# Patient Record
Sex: FEMALE | Race: WHITE | NOT HISPANIC OR LATINO | URBAN - METROPOLITAN AREA
[De-identification: names, ages, dates, MRNs, and addresses within clinical notes are randomized per-mention and may not be internally consistent; named-entity substitution may affect disease eponyms.]

---

## 2019-03-23 VITALS
DIASTOLIC BLOOD PRESSURE: 77 MMHG | RESPIRATION RATE: 20 BRPM | TEMPERATURE: 97 F | OXYGEN SATURATION: 94 % | SYSTOLIC BLOOD PRESSURE: 155 MMHG | HEART RATE: 69 BPM | WEIGHT: 141.98 LBS

## 2019-03-23 LAB
ANION GAP SERPL CALC-SCNC: 10 MMOL/L — SIGNIFICANT CHANGE UP (ref 5–17)
APTT BLD: 33.7 SEC — SIGNIFICANT CHANGE UP (ref 27.5–36.3)
BASOPHILS # BLD AUTO: 0 K/UL — SIGNIFICANT CHANGE UP (ref 0–0.2)
BASOPHILS NFR BLD AUTO: 0 % — SIGNIFICANT CHANGE UP (ref 0–2)
BUN SERPL-MCNC: 20 MG/DL — SIGNIFICANT CHANGE UP (ref 7–23)
CALCIUM SERPL-MCNC: 9.1 MG/DL — SIGNIFICANT CHANGE UP (ref 8.4–10.5)
CHLORIDE SERPL-SCNC: 101 MMOL/L — SIGNIFICANT CHANGE UP (ref 96–108)
CO2 SERPL-SCNC: 28 MMOL/L — SIGNIFICANT CHANGE UP (ref 22–31)
CREAT SERPL-MCNC: 1.32 MG/DL — HIGH (ref 0.5–1.3)
EOSINOPHIL # BLD AUTO: 0 K/UL — SIGNIFICANT CHANGE UP (ref 0–0.5)
EOSINOPHIL NFR BLD AUTO: 0 % — SIGNIFICANT CHANGE UP (ref 0–6)
GLUCOSE SERPL-MCNC: 129 MG/DL — HIGH (ref 70–99)
HCT VFR BLD CALC: 35.2 % — SIGNIFICANT CHANGE UP (ref 34.5–45)
HGB BLD-MCNC: 11.1 G/DL — LOW (ref 11.5–15.5)
INR BLD: 1.12 — SIGNIFICANT CHANGE UP (ref 0.88–1.16)
LYMPHOCYTES # BLD AUTO: 0.57 K/UL — LOW (ref 1–3.3)
LYMPHOCYTES # BLD AUTO: 8.7 % — LOW (ref 13–44)
MCHC RBC-ENTMCNC: 31.5 GM/DL — LOW (ref 32–36)
MCHC RBC-ENTMCNC: 34.2 PG — HIGH (ref 27–34)
MCV RBC AUTO: 108.3 FL — HIGH (ref 80–100)
MONOCYTES # BLD AUTO: 0.17 K/UL — SIGNIFICANT CHANGE UP (ref 0–0.9)
MONOCYTES NFR BLD AUTO: 2.6 % — SIGNIFICANT CHANGE UP (ref 2–14)
NEUTROPHILS # BLD AUTO: 5.79 K/UL — SIGNIFICANT CHANGE UP (ref 1.8–7.4)
NEUTROPHILS NFR BLD AUTO: 85.2 % — HIGH (ref 43–77)
PLATELET # BLD AUTO: 179 K/UL — SIGNIFICANT CHANGE UP (ref 150–400)
POTASSIUM SERPL-MCNC: 4.3 MMOL/L — SIGNIFICANT CHANGE UP (ref 3.5–5.3)
POTASSIUM SERPL-SCNC: 4.3 MMOL/L — SIGNIFICANT CHANGE UP (ref 3.5–5.3)
PROTHROM AB SERPL-ACNC: 12.7 SEC — SIGNIFICANT CHANGE UP (ref 10–12.9)
RBC # BLD: 3.25 M/UL — LOW (ref 3.8–5.2)
RBC # FLD: 14.9 % — HIGH (ref 10.3–14.5)
SODIUM SERPL-SCNC: 139 MMOL/L — SIGNIFICANT CHANGE UP (ref 135–145)
WBC # BLD: 6.53 K/UL — SIGNIFICANT CHANGE UP (ref 3.8–10.5)
WBC # FLD AUTO: 6.53 K/UL — SIGNIFICANT CHANGE UP (ref 3.8–10.5)

## 2019-03-23 PROCEDURE — 73552 X-RAY EXAM OF FEMUR 2/>: CPT | Mod: 26,RT

## 2019-03-23 PROCEDURE — 71045 X-RAY EXAM CHEST 1 VIEW: CPT | Mod: 26

## 2019-03-23 PROCEDURE — 73502 X-RAY EXAM HIP UNI 2-3 VIEWS: CPT | Mod: 26,RT

## 2019-03-23 PROCEDURE — 73560 X-RAY EXAM OF KNEE 1 OR 2: CPT | Mod: 26,RT

## 2019-03-23 RX ORDER — MORPHINE SULFATE 50 MG/1
4 CAPSULE, EXTENDED RELEASE ORAL ONCE
Qty: 0 | Refills: 0 | Status: DISCONTINUED | OUTPATIENT
Start: 2019-03-23 | End: 2019-03-23

## 2019-03-23 RX ADMIN — MORPHINE SULFATE 4 MILLIGRAM(S): 50 CAPSULE, EXTENDED RELEASE ORAL at 23:24

## 2019-03-23 NOTE — ED PROVIDER NOTE - OBJECTIVE STATEMENT
89 year old female with history of bilateral knee prosthesis presents to ED with concern 89 year old female with history of bilateral knee prosthesis presents to ED with concern mechanical fall today in bathroom immediately prior to arrival.  Patient is complaining of right knee and hip pain.  She denies hit to head, loss of consciousness, upper extremity pain, left lower extremity pain or any additional acute medical complaints or concerns at this time.  She has not attempted ambulation following incident today.  She declines pain medication in ED on initial evaluation.

## 2019-03-23 NOTE — ED PROVIDER NOTE - NEUROLOGICAL, MLM
Alert and oriented, no focal deficits, no motor or sensory deficits.  Sensation intact and symmetric throughout bilateral LEs.  Dorsalis pedis pulses intact and symmetric to bilateral LEs.

## 2019-03-23 NOTE — ED PROVIDER NOTE - CARE PLAN
Principal Discharge DX:	Hip pain  Secondary Diagnosis:	Acute pain of right knee  Secondary Diagnosis:	Fall, initial encounter

## 2019-03-23 NOTE — ED ADULT NURSE NOTE - INTERVENTIONS DEFINITIONS
Reinforce activity limits and safety measures with patient and family/[place] to call system/Non-slip footwear when patient is off stretcher/Provide visual cue, wrist band, yellow gown, etc./Physically safe environment: no spills, clutter or unnecessary equipment/Instruct patient to call for assistance

## 2019-03-23 NOTE — ED ADULT TRIAGE NOTE - CHIEF COMPLAINT QUOTE
pt BIBA from home s/p trip and fall in the bathroom at home denies head trauma LOC complains of pain to right hip and right knee takes eliquis. Pt on chronic 2L home O2

## 2019-03-23 NOTE — ED ADULT NURSE NOTE - OBJECTIVE STATEMENT
Patient presents to the ED with c/o right hip, thigh and knee pain s/p mechanical fall in bathroom. AAOX4, denies head trauma, LOC, dizziness, CP or SOB. States hx of knee surgery. right leg appears shorter than the left at this time. Movement causes a remarkable increase in pain for the patient. NAD noted

## 2019-03-23 NOTE — ED PROVIDER NOTE - MUSCULOSKELETAL, MLM
Spine appears normal, there is no midline cervical, thoracic or lumbar spine pain/tenderness/stepoff/deformity.  range of motion is limited to RLE 2/2 pain, no obvious deformity to affected LLE.  Compartments soft x 4 extremities.

## 2019-03-23 NOTE — ED PROVIDER NOTE - CLINICAL SUMMARY MEDICAL DECISION MAKING FREE TEXT BOX
Patient in ED s/p mechanical fall with complaint of right hip and knee pain.  Preop clearance completed and plain film imaging reviewed.  Distal femur fracture noted.  Ortho in ED and knee immobilizer is placed.  Pain controlled with IV narcotics and CT knee and hip completed.  X ray imaging of hip concerning for possible fracture - CT to confirm.  Ortho requesting tele admission to ortho and will f/u CT imaging.  Patient aware of plan and in agreement.  Will admit at this time.

## 2019-03-24 ENCOUNTER — INPATIENT (INPATIENT)
Facility: HOSPITAL | Age: 84
LOS: 2 days | Discharge: NURSING, REHAB OR LONG-TERM CARE | DRG: 467 | End: 2019-03-27
Attending: ORTHOPAEDIC SURGERY | Admitting: ORTHOPAEDIC SURGERY
Payer: MEDICARE

## 2019-03-24 DIAGNOSIS — D50.0 IRON DEFICIENCY ANEMIA SECONDARY TO BLOOD LOSS (CHRONIC): ICD-10-CM

## 2019-03-24 DIAGNOSIS — I48.2 CHRONIC ATRIAL FIBRILLATION: ICD-10-CM

## 2019-03-24 DIAGNOSIS — Z98.890 OTHER SPECIFIED POSTPROCEDURAL STATES: Chronic | ICD-10-CM

## 2019-03-24 DIAGNOSIS — G47.33 OBSTRUCTIVE SLEEP APNEA (ADULT) (PEDIATRIC): ICD-10-CM

## 2019-03-24 DIAGNOSIS — R06.09 OTHER FORMS OF DYSPNEA: ICD-10-CM

## 2019-03-24 DIAGNOSIS — Z01.818 ENCOUNTER FOR OTHER PREPROCEDURAL EXAMINATION: ICD-10-CM

## 2019-03-24 DIAGNOSIS — N17.9 ACUTE KIDNEY FAILURE, UNSPECIFIED: ICD-10-CM

## 2019-03-24 LAB
ANION GAP SERPL CALC-SCNC: 9 MMOL/L — SIGNIFICANT CHANGE UP (ref 5–17)
APTT BLD: 31.6 SEC — SIGNIFICANT CHANGE UP (ref 27.5–36.3)
BASOPHILS # BLD AUTO: 0.06 K/UL — SIGNIFICANT CHANGE UP (ref 0–0.2)
BASOPHILS NFR BLD AUTO: 0.8 % — SIGNIFICANT CHANGE UP (ref 0–2)
BLD GP AB SCN SERPL QL: NEGATIVE — SIGNIFICANT CHANGE UP
BUN SERPL-MCNC: 19 MG/DL — SIGNIFICANT CHANGE UP (ref 7–23)
CALCIUM SERPL-MCNC: 8.5 MG/DL — SIGNIFICANT CHANGE UP (ref 8.4–10.5)
CHLORIDE SERPL-SCNC: 104 MMOL/L — SIGNIFICANT CHANGE UP (ref 96–108)
CO2 SERPL-SCNC: 28 MMOL/L — SIGNIFICANT CHANGE UP (ref 22–31)
CREAT SERPL-MCNC: 1.23 MG/DL — SIGNIFICANT CHANGE UP (ref 0.5–1.3)
EOSINOPHIL # BLD AUTO: 0.04 K/UL — SIGNIFICANT CHANGE UP (ref 0–0.5)
EOSINOPHIL NFR BLD AUTO: 0.6 % — SIGNIFICANT CHANGE UP (ref 0–6)
GLUCOSE SERPL-MCNC: 113 MG/DL — HIGH (ref 70–99)
HCT VFR BLD CALC: 30.1 % — LOW (ref 34.5–45)
HGB BLD-MCNC: 9.4 G/DL — LOW (ref 11.5–15.5)
IMM GRANULOCYTES NFR BLD AUTO: 0.4 % — SIGNIFICANT CHANGE UP (ref 0–1.5)
INR BLD: 1.14 — SIGNIFICANT CHANGE UP (ref 0.88–1.16)
LYMPHOCYTES # BLD AUTO: 0.63 K/UL — LOW (ref 1–3.3)
LYMPHOCYTES # BLD AUTO: 8.7 % — LOW (ref 13–44)
MCHC RBC-ENTMCNC: 31.2 GM/DL — LOW (ref 32–36)
MCHC RBC-ENTMCNC: 34.1 PG — HIGH (ref 27–34)
MCV RBC AUTO: 109.1 FL — HIGH (ref 80–100)
MONOCYTES # BLD AUTO: 0.55 K/UL — SIGNIFICANT CHANGE UP (ref 0–0.9)
MONOCYTES NFR BLD AUTO: 7.6 % — SIGNIFICANT CHANGE UP (ref 2–14)
NEUTROPHILS # BLD AUTO: 5.91 K/UL — SIGNIFICANT CHANGE UP (ref 1.8–7.4)
NEUTROPHILS NFR BLD AUTO: 81.9 % — HIGH (ref 43–77)
NRBC # BLD: 0 /100 WBCS — SIGNIFICANT CHANGE UP (ref 0–0)
PLATELET # BLD AUTO: 154 K/UL — SIGNIFICANT CHANGE UP (ref 150–400)
POTASSIUM SERPL-MCNC: 4.3 MMOL/L — SIGNIFICANT CHANGE UP (ref 3.5–5.3)
POTASSIUM SERPL-SCNC: 4.3 MMOL/L — SIGNIFICANT CHANGE UP (ref 3.5–5.3)
PROTHROM AB SERPL-ACNC: 12.9 SEC — SIGNIFICANT CHANGE UP (ref 10–12.9)
RBC # BLD: 2.76 M/UL — LOW (ref 3.8–5.2)
RBC # FLD: 15.2 % — HIGH (ref 10.3–14.5)
RH IG SCN BLD-IMP: NEGATIVE — SIGNIFICANT CHANGE UP
RH IG SCN BLD-IMP: NEGATIVE — SIGNIFICANT CHANGE UP
SODIUM SERPL-SCNC: 141 MMOL/L — SIGNIFICANT CHANGE UP (ref 135–145)
WBC # BLD: 7.22 K/UL — SIGNIFICANT CHANGE UP (ref 3.8–10.5)
WBC # FLD AUTO: 7.22 K/UL — SIGNIFICANT CHANGE UP (ref 3.8–10.5)

## 2019-03-24 PROCEDURE — 99285 EMERGENCY DEPT VISIT HI MDM: CPT | Mod: 25

## 2019-03-24 PROCEDURE — 73700 CT LOWER EXTREMITY W/O DYE: CPT | Mod: 26,RT,76

## 2019-03-24 PROCEDURE — 93010 ELECTROCARDIOGRAM REPORT: CPT

## 2019-03-24 PROCEDURE — 99223 1ST HOSP IP/OBS HIGH 75: CPT | Mod: GC

## 2019-03-24 RX ORDER — DOCUSATE SODIUM 100 MG
100 CAPSULE ORAL THREE TIMES A DAY
Qty: 0 | Refills: 0 | Status: DISCONTINUED | OUTPATIENT
Start: 2019-03-24 | End: 2019-03-25

## 2019-03-24 RX ORDER — TRAMADOL HYDROCHLORIDE 50 MG/1
25 TABLET ORAL
Qty: 0 | Refills: 0 | Status: DISCONTINUED | OUTPATIENT
Start: 2019-03-24 | End: 2019-03-25

## 2019-03-24 RX ORDER — TRAMADOL HYDROCHLORIDE 50 MG/1
50 TABLET ORAL
Qty: 0 | Refills: 0 | Status: DISCONTINUED | OUTPATIENT
Start: 2019-03-24 | End: 2019-03-25

## 2019-03-24 RX ORDER — LEVOTHYROXINE SODIUM 125 MCG
1 TABLET ORAL
Qty: 0 | Refills: 0 | COMMUNITY

## 2019-03-24 RX ORDER — FUROSEMIDE 40 MG
1 TABLET ORAL
Qty: 0 | Refills: 0 | COMMUNITY

## 2019-03-24 RX ORDER — SODIUM CHLORIDE 9 MG/ML
1000 INJECTION, SOLUTION INTRAVENOUS
Qty: 0 | Refills: 0 | Status: DISCONTINUED | OUTPATIENT
Start: 2019-03-24 | End: 2019-03-25

## 2019-03-24 RX ORDER — AMIODARONE HYDROCHLORIDE 400 MG/1
100 TABLET ORAL DAILY
Qty: 0 | Refills: 0 | Status: DISCONTINUED | OUTPATIENT
Start: 2019-03-24 | End: 2019-03-25

## 2019-03-24 RX ORDER — MORPHINE SULFATE 50 MG/1
4 CAPSULE, EXTENDED RELEASE ORAL ONCE
Qty: 0 | Refills: 0 | Status: DISCONTINUED | OUTPATIENT
Start: 2019-03-24 | End: 2019-03-24

## 2019-03-24 RX ORDER — LEVOTHYROXINE SODIUM 125 MCG
25 TABLET ORAL DAILY
Qty: 0 | Refills: 0 | Status: DISCONTINUED | OUTPATIENT
Start: 2019-03-24 | End: 2019-03-25

## 2019-03-24 RX ORDER — FUROSEMIDE 40 MG
40 TABLET ORAL DAILY
Qty: 0 | Refills: 0 | Status: DISCONTINUED | OUTPATIENT
Start: 2019-03-24 | End: 2019-03-24

## 2019-03-24 RX ORDER — FERROUS GLUCONATE 100 %
1 POWDER (GRAM) MISCELLANEOUS
Qty: 0 | Refills: 0 | COMMUNITY

## 2019-03-24 RX ORDER — AMIODARONE HYDROCHLORIDE 400 MG/1
1 TABLET ORAL
Qty: 0 | Refills: 0 | COMMUNITY

## 2019-03-24 RX ORDER — MAGNESIUM HYDROXIDE 400 MG/1
30 TABLET, CHEWABLE ORAL DAILY
Qty: 0 | Refills: 0 | Status: DISCONTINUED | OUTPATIENT
Start: 2019-03-24 | End: 2019-03-25

## 2019-03-24 RX ORDER — ACETAMINOPHEN 500 MG
650 TABLET ORAL EVERY 6 HOURS
Qty: 0 | Refills: 0 | Status: DISCONTINUED | OUTPATIENT
Start: 2019-03-24 | End: 2019-03-25

## 2019-03-24 RX ORDER — MORPHINE SULFATE 50 MG/1
2 CAPSULE, EXTENDED RELEASE ORAL EVERY 4 HOURS
Qty: 0 | Refills: 0 | Status: DISCONTINUED | OUTPATIENT
Start: 2019-03-24 | End: 2019-03-25

## 2019-03-24 RX ORDER — PANTOPRAZOLE SODIUM 20 MG/1
1 TABLET, DELAYED RELEASE ORAL
Qty: 0 | Refills: 0 | COMMUNITY

## 2019-03-24 RX ORDER — PANTOPRAZOLE SODIUM 20 MG/1
40 TABLET, DELAYED RELEASE ORAL
Qty: 0 | Refills: 0 | Status: DISCONTINUED | OUTPATIENT
Start: 2019-03-24 | End: 2019-03-25

## 2019-03-24 RX ORDER — FUROSEMIDE 40 MG
20 TABLET ORAL
Qty: 0 | Refills: 0 | Status: DISCONTINUED | OUTPATIENT
Start: 2019-03-24 | End: 2019-03-25

## 2019-03-24 RX ORDER — ZALEPLON 10 MG
5 CAPSULE ORAL AT BEDTIME
Qty: 0 | Refills: 0 | Status: DISCONTINUED | OUTPATIENT
Start: 2019-03-24 | End: 2019-03-25

## 2019-03-24 RX ORDER — AMIODARONE HYDROCHLORIDE 400 MG/1
200 TABLET ORAL DAILY
Qty: 0 | Refills: 0 | Status: DISCONTINUED | OUTPATIENT
Start: 2019-03-24 | End: 2019-03-24

## 2019-03-24 RX ORDER — FAMOTIDINE 10 MG/ML
20 INJECTION INTRAVENOUS EVERY 12 HOURS
Qty: 0 | Refills: 0 | Status: DISCONTINUED | OUTPATIENT
Start: 2019-03-24 | End: 2019-03-25

## 2019-03-24 RX ADMIN — MORPHINE SULFATE 4 MILLIGRAM(S): 50 CAPSULE, EXTENDED RELEASE ORAL at 02:03

## 2019-03-24 RX ADMIN — Medication 100 MILLIGRAM(S): at 12:48

## 2019-03-24 RX ADMIN — FAMOTIDINE 20 MILLIGRAM(S): 10 INJECTION INTRAVENOUS at 17:36

## 2019-03-24 RX ADMIN — TRAMADOL HYDROCHLORIDE 50 MILLIGRAM(S): 50 TABLET ORAL at 23:00

## 2019-03-24 RX ADMIN — Medication 100 MILLIGRAM(S): at 06:22

## 2019-03-24 RX ADMIN — TRAMADOL HYDROCHLORIDE 50 MILLIGRAM(S): 50 TABLET ORAL at 17:36

## 2019-03-24 RX ADMIN — TRAMADOL HYDROCHLORIDE 50 MILLIGRAM(S): 50 TABLET ORAL at 13:10

## 2019-03-24 RX ADMIN — Medication 100 MILLIGRAM(S): at 21:33

## 2019-03-24 RX ADMIN — AMIODARONE HYDROCHLORIDE 100 MILLIGRAM(S): 400 TABLET ORAL at 08:07

## 2019-03-24 RX ADMIN — Medication 25 MICROGRAM(S): at 06:19

## 2019-03-24 RX ADMIN — TRAMADOL HYDROCHLORIDE 50 MILLIGRAM(S): 50 TABLET ORAL at 18:36

## 2019-03-24 RX ADMIN — FAMOTIDINE 20 MILLIGRAM(S): 10 INJECTION INTRAVENOUS at 06:21

## 2019-03-24 RX ADMIN — TRAMADOL HYDROCHLORIDE 50 MILLIGRAM(S): 50 TABLET ORAL at 12:48

## 2019-03-24 RX ADMIN — TRAMADOL HYDROCHLORIDE 50 MILLIGRAM(S): 50 TABLET ORAL at 23:30

## 2019-03-24 RX ADMIN — PANTOPRAZOLE SODIUM 40 MILLIGRAM(S): 20 TABLET, DELAYED RELEASE ORAL at 06:21

## 2019-03-24 RX ADMIN — Medication 5 MILLIGRAM(S): at 22:55

## 2019-03-24 NOTE — CONSULT NOTE ADULT - PROBLEM SELECTOR RECOMMENDATION 2
she is on oxygen at night as she did not tolerate the cpap.  Avoid oversedation and  continue monitor

## 2019-03-24 NOTE — CONSULT NOTE ADULT - SUBJECTIVE AND OBJECTIVE BOX
Patient is a 89y old  Female who presents with a chief complaint of     HPI:  89F h/o Afib (s/p ablation, currently on eliquis - last dose 3/23), bilateral TKA p/w R knee pain after [facility] fall. Pt visiting son and friends from [place]. Pt slipped in bathroom and fell onto R knee. No head trauma or LOC. no numbness or tingling. Unable to ambulate after fall. Denies [facility] no SOB or CP (24 Mar 2019 03:20)      PAST MEDICAL & SURGICAL HISTORY:  H/O: knee surgery: BL      FAMILY HISTORY:      SOCIAL HISTORY:  Smoking Status: [ ] Current, [ ] Former, [x ] Never  Pack Years:    MEDICATIONS:  Pulmonary:    Antimicrobials:    Anticoagulants:    Onc:    GI/:  docusate sodium 100 milliGRAM(s) Oral three times a day  famotidine    Tablet 20 milliGRAM(s) Oral every 12 hours  magnesium hydroxide Suspension 30 milliLiter(s) Oral daily PRN  pantoprazole    Tablet 40 milliGRAM(s) Oral before breakfast    Endocrine:  levothyroxine 25 MICROGram(s) Oral daily    Cardiac:  amiodarone    Tablet 100 milliGRAM(s) Oral daily  furosemide    Tablet 20 milliGRAM(s) Oral <User Schedule>    Other Medications:  acetaminophen   Tablet .. 650 milliGRAM(s) Oral every 6 hours PRN  lactated ringers. 1000 milliLiter(s) IV Continuous <Continuous>  morphine  - Injectable 2 milliGRAM(s) IV Push every 4 hours PRN  traMADol 25 milliGRAM(s) Oral four times a day PRN  traMADol 50 milliGRAM(s) Oral four times a day PRN  zaleplon 5 milliGRAM(s) Oral at bedtime PRN      Allergies    sulfa drugs (Rash)    Intolerances        Vital Signs Last 24 Hrs  T(C): 36.4 (24 Mar 2019 04:48), Max: 36.7 (24 Mar 2019 02:48)  T(F): 97.6 (24 Mar 2019 04:48), Max: 98 (24 Mar 2019 02:48)  HR: 62 (24 Mar 2019 04:48) (62 - 70)  BP: 106/53 (24 Mar 2019 04:48) (106/53 - 155/77)  BP(mean): --  RR: 18 (24 Mar 2019 04:48) (16 - 20)  SpO2: 98% (24 Mar 2019 04:48) (94% - 98%)        LABS:      CBC Full  -  ( 24 Mar 2019 06:09 )  WBC Count : 7.22 K/uL  Hemoglobin : 9.4 g/dL  Hematocrit : 30.1 %  Platelet Count - Automated : 154 K/uL  Mean Cell Volume : 109.1 fl  Mean Cell Hemoglobin : 34.1 pg  Mean Cell Hemoglobin Concentration : 31.2 gm/dL  Auto Neutrophil # : 5.91 K/uL  Auto Lymphocyte # : 0.63 K/uL  Auto Monocyte # : 0.55 K/uL  Auto Eosinophil # : 0.04 K/uL  Auto Basophil # : 0.06 K/uL  Auto Neutrophil % : 81.9 %  Auto Lymphocyte % : 8.7 %  Auto Monocyte % : 7.6 %  Auto Eosinophil % : 0.6 %  Auto Basophil % : 0.8 %    03-24    141  |  104  |  19  ----------------------------<  113<H>  4.3   |  28  |  1.23    Ca    8.5      24 Mar 2019 06:09      PT/INR - ( 24 Mar 2019 06:09 )   PT: 12.9 sec;   INR: 1.14          PTT - ( 24 Mar 2019 06:09 )  PTT:31.6 sec      CXR hyperinflation and prominent intersitial marking  EKG RSR normal              RADIOLOGY & ADDITIONAL STUDIES (The following images were personally reviewed):

## 2019-03-24 NOTE — CONSULT NOTE ADULT - PROBLEM SELECTOR RECOMMENDATION 9
she had cardioversion 5 years ago and has been in sinus rhythm since then.  Her amiodarone dose was halved recently.  Continue current meds and monitor.  Hold apixaban.  She is at risk for conversion back to artial fib postop.  ECHO

## 2019-03-24 NOTE — PROGRESS NOTE ADULT - SUBJECTIVE AND OBJECTIVE BOX
Ortho Note    Pt comfortable without complaints, pain endorsed but controlled  Denies CP, SOB, N/V, numbness/tingling     Vital Signs Last 24 Hrs  T(C): 36.4 (03-24-19 @ 04:48), Max: 36.7 (03-24-19 @ 02:48)  T(F): 97.6 (03-24-19 @ 04:48), Max: 98 (03-24-19 @ 02:48)  HR: 62 (03-24-19 @ 04:48) (62 - 64)  BP: 106/53 (03-24-19 @ 04:48) (106/53 - 150/65)  BP(mean): --  RR: 18 (03-24-19 @ 04:48) (16 - 18)  SpO2: 98% (03-24-19 @ 04:48) (97% - 98%)  AVSS    NAD AAOx3   	RLE no open wounds or abrasion at knee  	+swollen and lateral deformity  	SILT s/s/sp/dp/t  	+PF/DF/EHL/FHL distally  	2+ PT                           9.4    7.22  )-----------( 154      ( 24 Mar 2019 06:09 )             30.1   24 Mar 2019 06:09    141    |  104    |  19     ----------------------------<  113    4.3     |  28     |  1.23     Ca    8.5        24 Mar 2019 06:09      A/P: 89yFemale w R periprosthetic distal femur fracture  - Medical clearance today  - NPO at midnight for tentative OR on Monday   - Pain Control  - DVT ppx: holding eliquis   - PT, WBS: NWB   - f/u AM labs   - Dispo: home v rehab     Ortho Pager [phone] Ortho Note    Pt comfortable without complaints, pain endorsed but controlled  Denies CP, SOB, N/V, numbness/tingling     Vital Signs Last 24 Hrs  T(C): 36.4 (03-24-19 @ 04:48), Max: 36.7 (03-24-19 @ 02:48)  T(F): 97.6 (03-24-19 @ 04:48), Max: 98 (03-24-19 @ 02:48)  HR: 62 (03-24-19 @ 04:48) (62 - 64)  BP: 106/53 (03-24-19 @ 04:48) (106/53 - 150/65)  BP(mean): --  RR: 18 (03-24-19 @ 04:48) (16 - 18)  SpO2: 98% (03-24-19 @ 04:48) (97% - 98%)  AVSS    NAD AAOx3   	RLE no open wounds or abrasion at knee  	+swollen and lateral deformity, Knee immobilizer intact   	SILT s/s/sp/dp/t  	+PF/DF/EHL/FHL distally  	2+ PT                           9.4    7.22  )-----------( 154      ( 24 Mar 2019 06:09 )             30.1   24 Mar 2019 06:09    141    |  104    |  19     ----------------------------<  113    4.3     |  28     |  1.23     Ca    8.5        24 Mar 2019 06:09      A/P: 89yFemale w R periprosthetic distal femur fracture  - Medical clearance today  - NPO at midnight for tentative OR on Monday   - Pain Control  - DVT ppx: holding eliquis   - PT, WBS: NWB   - f/u AM labs   - Dispo: home v rehab     Ortho Pager [phone]

## 2019-03-24 NOTE — CONSULT NOTE ADULT - PROBLEM SELECTOR RECOMMENDATION 5
The patient's medical condition is optimized for surgery.  There is no contraindication for surgery.  There is no clinical evidence neither of angina, decompensated CHF, arrhthymias, nor valvular disease.   There is no limitation of exercise capacity.  MET is 4 .  ASA class is 2.  Green cardiac risk factor is high .  DVT prophylaxis is indicated.  Pain control.  Early mobilization.  Avoid fluid overload.

## 2019-03-24 NOTE — H&P ADULT - NSHPPHYSICALEXAM_GEN_ALL_CORE
NAD AAOx3   RLE no open wounds or abrasion at knee  +swollen and lateral deformity  SILT s/s/sp/dp/t  +PF/DF/EHL/FHL distally  2+ PT     skeletal survey negative

## 2019-03-24 NOTE — H&P ADULT - HISTORY OF PRESENT ILLNESS
89F h/o Afib (s/p ablation, currently on eliquis - last dose 3/23), bilateral TKA p/w R knee pain after [facility] fall. Pt visiting son and friends from [place]. Pt slipped in bathroom and fell onto R knee. No head trauma or LOC. no numbness or tingling. Unable to ambulate after fall. Denies [facility]. no SOB or CP

## 2019-03-24 NOTE — H&P ADULT - ASSESSMENT
89F w R periprosthetic distal femur fracture    Admit to Orthopaedics  NPO at midnight, tentative OR plan for Monday 3/25  IVF  Pain control  Hold anticoagulation for OR  Labs  UA  type and screen  CXR   EKG  Medical Clearance  Discussed with attending, Dr Marin

## 2019-03-25 ENCOUNTER — APPOINTMENT (OUTPATIENT)
Dept: ORTHOPEDIC SURGERY | Facility: HOSPITAL | Age: 84
End: 2019-03-25
Payer: MEDICARE

## 2019-03-25 DIAGNOSIS — I27.21 SECONDARY PULMONARY ARTERIAL HYPERTENSION: ICD-10-CM

## 2019-03-25 LAB
ANION GAP SERPL CALC-SCNC: 7 MMOL/L — SIGNIFICANT CHANGE UP (ref 5–17)
ANION GAP SERPL CALC-SCNC: 9 MMOL/L — SIGNIFICANT CHANGE UP (ref 5–17)
APPEARANCE UR: CLEAR — SIGNIFICANT CHANGE UP
BASE EXCESS BLDA CALC-SCNC: 5.2 MMOL/L — HIGH (ref -2–3)
BILIRUB UR-MCNC: NEGATIVE — SIGNIFICANT CHANGE UP
BUN SERPL-MCNC: 12 MG/DL — SIGNIFICANT CHANGE UP (ref 7–23)
BUN SERPL-MCNC: 15 MG/DL — SIGNIFICANT CHANGE UP (ref 7–23)
CA-I BLDA-SCNC: 1.04 MMOL/L — LOW (ref 1.12–1.3)
CALCIUM SERPL-MCNC: 7.8 MG/DL — LOW (ref 8.4–10.5)
CALCIUM SERPL-MCNC: 8.5 MG/DL — SIGNIFICANT CHANGE UP (ref 8.4–10.5)
CHLORIDE SERPL-SCNC: 101 MMOL/L — SIGNIFICANT CHANGE UP (ref 96–108)
CHLORIDE SERPL-SCNC: 101 MMOL/L — SIGNIFICANT CHANGE UP (ref 96–108)
CO2 SERPL-SCNC: 25 MMOL/L — SIGNIFICANT CHANGE UP (ref 22–31)
CO2 SERPL-SCNC: 28 MMOL/L — SIGNIFICANT CHANGE UP (ref 22–31)
COHGB MFR BLDA: 1.2 % — SIGNIFICANT CHANGE UP
COLOR SPEC: YELLOW — SIGNIFICANT CHANGE UP
CREAT SERPL-MCNC: 0.98 MG/DL — SIGNIFICANT CHANGE UP (ref 0.5–1.3)
CREAT SERPL-MCNC: 1.15 MG/DL — SIGNIFICANT CHANGE UP (ref 0.5–1.3)
DIFF PNL FLD: NEGATIVE — SIGNIFICANT CHANGE UP
GLUCOSE BLDC GLUCOMTR-MCNC: 117 MG/DL — HIGH (ref 70–99)
GLUCOSE SERPL-MCNC: 106 MG/DL — HIGH (ref 70–99)
GLUCOSE SERPL-MCNC: 167 MG/DL — HIGH (ref 70–99)
GLUCOSE UR QL: NEGATIVE — SIGNIFICANT CHANGE UP
HCO3 BLDA-SCNC: 29 MMOL/L — HIGH (ref 21–28)
HCT VFR BLD CALC: 29.8 % — LOW (ref 34.5–45)
HCT VFR BLD CALC: 34.4 % — LOW (ref 34.5–45)
HGB BLD-MCNC: 11.2 G/DL — LOW (ref 11.5–15.5)
HGB BLD-MCNC: 9.2 G/DL — LOW (ref 11.5–15.5)
HGB BLDA-MCNC: 8.9 G/DL — LOW (ref 11.5–15.5)
KETONES UR-MCNC: NEGATIVE — SIGNIFICANT CHANGE UP
LEUKOCYTE ESTERASE UR-ACNC: NEGATIVE — SIGNIFICANT CHANGE UP
MCHC RBC-ENTMCNC: 30.9 GM/DL — LOW (ref 32–36)
MCHC RBC-ENTMCNC: 31.7 PG — SIGNIFICANT CHANGE UP (ref 27–34)
MCHC RBC-ENTMCNC: 32.6 GM/DL — SIGNIFICANT CHANGE UP (ref 32–36)
MCHC RBC-ENTMCNC: 33.9 PG — SIGNIFICANT CHANGE UP (ref 27–34)
MCV RBC AUTO: 110 FL — HIGH (ref 80–100)
MCV RBC AUTO: 97.5 FL — SIGNIFICANT CHANGE UP (ref 80–100)
METHGB MFR BLDA: 0.5 % — SIGNIFICANT CHANGE UP
MRSA PCR RESULT.: NEGATIVE — SIGNIFICANT CHANGE UP
NITRITE UR-MCNC: NEGATIVE — SIGNIFICANT CHANGE UP
NRBC # BLD: 0 /100 WBCS — SIGNIFICANT CHANGE UP (ref 0–0)
NRBC # BLD: 0 /100 WBCS — SIGNIFICANT CHANGE UP (ref 0–0)
O2 CT VFR BLDA CALC: 13.3 ML/DL — SIGNIFICANT CHANGE UP (ref 15–23)
OXYHGB MFR BLDA: 98 % — SIGNIFICANT CHANGE UP (ref 94–100)
PCO2 BLDA: 38 MMHG — SIGNIFICANT CHANGE UP (ref 32–45)
PH BLDA: 7.5 — HIGH (ref 7.35–7.45)
PH UR: 6 — SIGNIFICANT CHANGE UP (ref 5–8)
PLATELET # BLD AUTO: 142 K/UL — LOW (ref 150–400)
PLATELET # BLD AUTO: 146 K/UL — LOW (ref 150–400)
PO2 BLDA: 375 MMHG — HIGH (ref 83–108)
POTASSIUM BLDA-SCNC: 3.9 MMOL/L — SIGNIFICANT CHANGE UP (ref 3.5–4.9)
POTASSIUM SERPL-MCNC: 4.2 MMOL/L — SIGNIFICANT CHANGE UP (ref 3.5–5.3)
POTASSIUM SERPL-MCNC: 4.5 MMOL/L — SIGNIFICANT CHANGE UP (ref 3.5–5.3)
POTASSIUM SERPL-SCNC: 4.2 MMOL/L — SIGNIFICANT CHANGE UP (ref 3.5–5.3)
POTASSIUM SERPL-SCNC: 4.5 MMOL/L — SIGNIFICANT CHANGE UP (ref 3.5–5.3)
PROT UR-MCNC: NEGATIVE MG/DL — SIGNIFICANT CHANGE UP
RBC # BLD: 2.71 M/UL — LOW (ref 3.8–5.2)
RBC # BLD: 3.53 M/UL — LOW (ref 3.8–5.2)
RBC # FLD: 14.8 % — HIGH (ref 10.3–14.5)
RBC # FLD: 18.7 % — HIGH (ref 10.3–14.5)
S AUREUS DNA NOSE QL NAA+PROBE: NEGATIVE — SIGNIFICANT CHANGE UP
SAO2 % BLDA: 100 % — SIGNIFICANT CHANGE UP (ref 95–100)
SODIUM BLDA-SCNC: 134 MMOL/L — LOW (ref 138–146)
SODIUM SERPL-SCNC: 133 MMOL/L — LOW (ref 135–145)
SODIUM SERPL-SCNC: 138 MMOL/L — SIGNIFICANT CHANGE UP (ref 135–145)
SP GR SPEC: 1.01 — SIGNIFICANT CHANGE UP (ref 1–1.03)
UROBILINOGEN FLD QL: 0.2 E.U./DL — SIGNIFICANT CHANGE UP
WBC # BLD: 11.7 K/UL — HIGH (ref 3.8–10.5)
WBC # BLD: 7.13 K/UL — SIGNIFICANT CHANGE UP (ref 3.8–10.5)
WBC # FLD AUTO: 11.7 K/UL — HIGH (ref 3.8–10.5)
WBC # FLD AUTO: 7.13 K/UL — SIGNIFICANT CHANGE UP (ref 3.8–10.5)

## 2019-03-25 PROCEDURE — 93306 TTE W/DOPPLER COMPLETE: CPT | Mod: 26

## 2019-03-25 PROCEDURE — 27487 REVISE/REPLACE KNEE JOINT: CPT | Mod: 22,RT

## 2019-03-25 PROCEDURE — 99232 SBSQ HOSP IP/OBS MODERATE 35: CPT | Mod: GC

## 2019-03-25 PROCEDURE — 73560 X-RAY EXAM OF KNEE 1 OR 2: CPT | Mod: 26,RT

## 2019-03-25 RX ORDER — SODIUM CHLORIDE 9 MG/ML
1000 INJECTION, SOLUTION INTRAVENOUS
Qty: 0 | Refills: 0 | Status: DISCONTINUED | OUTPATIENT
Start: 2019-03-25 | End: 2019-03-26

## 2019-03-25 RX ORDER — CEFAZOLIN SODIUM 1 G
2000 VIAL (EA) INJECTION EVERY 8 HOURS
Qty: 0 | Refills: 0 | Status: COMPLETED | OUTPATIENT
Start: 2019-03-25 | End: 2019-03-26

## 2019-03-25 RX ORDER — CEFAZOLIN SODIUM 1 G
2000 VIAL (EA) INJECTION EVERY 8 HOURS
Qty: 0 | Refills: 0 | Status: DISCONTINUED | OUTPATIENT
Start: 2019-03-25 | End: 2019-03-25

## 2019-03-25 RX ORDER — POLYETHYLENE GLYCOL 3350 17 G/17G
17 POWDER, FOR SOLUTION ORAL DAILY
Qty: 0 | Refills: 0 | Status: DISCONTINUED | OUTPATIENT
Start: 2019-03-25 | End: 2019-03-27

## 2019-03-25 RX ORDER — LEVOTHYROXINE SODIUM 125 MCG
25 TABLET ORAL DAILY
Qty: 0 | Refills: 0 | Status: DISCONTINUED | OUTPATIENT
Start: 2019-03-25 | End: 2019-03-27

## 2019-03-25 RX ORDER — OXYCODONE HYDROCHLORIDE 5 MG/1
5 TABLET ORAL EVERY 4 HOURS
Qty: 0 | Refills: 0 | Status: DISCONTINUED | OUTPATIENT
Start: 2019-03-25 | End: 2019-03-27

## 2019-03-25 RX ORDER — ACETAMINOPHEN 500 MG
650 TABLET ORAL EVERY 6 HOURS
Qty: 0 | Refills: 0 | Status: DISCONTINUED | OUTPATIENT
Start: 2019-03-25 | End: 2019-03-27

## 2019-03-25 RX ORDER — MAGNESIUM HYDROXIDE 400 MG/1
30 TABLET, CHEWABLE ORAL DAILY
Qty: 0 | Refills: 0 | Status: DISCONTINUED | OUTPATIENT
Start: 2019-03-25 | End: 2019-03-27

## 2019-03-25 RX ORDER — OXYCODONE HYDROCHLORIDE 5 MG/1
10 TABLET ORAL EVERY 4 HOURS
Qty: 0 | Refills: 0 | Status: DISCONTINUED | OUTPATIENT
Start: 2019-03-25 | End: 2019-03-27

## 2019-03-25 RX ORDER — FUROSEMIDE 40 MG
40 TABLET ORAL DAILY
Qty: 0 | Refills: 0 | Status: DISCONTINUED | OUTPATIENT
Start: 2019-03-25 | End: 2019-03-27

## 2019-03-25 RX ORDER — HYDROMORPHONE HYDROCHLORIDE 2 MG/ML
0.5 INJECTION INTRAMUSCULAR; INTRAVENOUS; SUBCUTANEOUS EVERY 4 HOURS
Qty: 0 | Refills: 0 | Status: DISCONTINUED | OUTPATIENT
Start: 2019-03-25 | End: 2019-03-27

## 2019-03-25 RX ORDER — ASPIRIN/CALCIUM CARB/MAGNESIUM 324 MG
325 TABLET ORAL
Qty: 0 | Refills: 0 | Status: DISCONTINUED | OUTPATIENT
Start: 2019-03-25 | End: 2019-03-27

## 2019-03-25 RX ORDER — ONDANSETRON 8 MG/1
4 TABLET, FILM COATED ORAL EVERY 6 HOURS
Qty: 0 | Refills: 0 | Status: DISCONTINUED | OUTPATIENT
Start: 2019-03-25 | End: 2019-03-27

## 2019-03-25 RX ORDER — BUPIVACAINE 13.3 MG/ML
20 INJECTION, SUSPENSION, LIPOSOMAL INFILTRATION ONCE
Qty: 0 | Refills: 0 | Status: DISCONTINUED | OUTPATIENT
Start: 2019-03-25 | End: 2019-03-27

## 2019-03-25 RX ORDER — HYDROMORPHONE HYDROCHLORIDE 2 MG/ML
0.5 INJECTION INTRAMUSCULAR; INTRAVENOUS; SUBCUTANEOUS
Qty: 0 | Refills: 0 | Status: DISCONTINUED | OUTPATIENT
Start: 2019-03-25 | End: 2019-03-27

## 2019-03-25 RX ORDER — SENNA PLUS 8.6 MG/1
2 TABLET ORAL AT BEDTIME
Qty: 0 | Refills: 0 | Status: DISCONTINUED | OUTPATIENT
Start: 2019-03-25 | End: 2019-03-27

## 2019-03-25 RX ORDER — DOCUSATE SODIUM 100 MG
100 CAPSULE ORAL THREE TIMES A DAY
Qty: 0 | Refills: 0 | Status: DISCONTINUED | OUTPATIENT
Start: 2019-03-25 | End: 2019-03-27

## 2019-03-25 RX ORDER — PANTOPRAZOLE SODIUM 20 MG/1
40 TABLET, DELAYED RELEASE ORAL
Qty: 0 | Refills: 0 | Status: DISCONTINUED | OUTPATIENT
Start: 2019-03-25 | End: 2019-03-27

## 2019-03-25 RX ADMIN — Medication 20 MILLIGRAM(S): at 06:38

## 2019-03-25 RX ADMIN — AMIODARONE HYDROCHLORIDE 100 MILLIGRAM(S): 400 TABLET ORAL at 06:38

## 2019-03-25 RX ADMIN — Medication 25 MICROGRAM(S): at 06:38

## 2019-03-25 RX ADMIN — PANTOPRAZOLE SODIUM 40 MILLIGRAM(S): 20 TABLET, DELAYED RELEASE ORAL at 06:38

## 2019-03-25 RX ADMIN — TRAMADOL HYDROCHLORIDE 50 MILLIGRAM(S): 50 TABLET ORAL at 07:00

## 2019-03-25 RX ADMIN — Medication 100 MILLIGRAM(S): at 06:38

## 2019-03-25 RX ADMIN — Medication 100 MILLIGRAM(S): at 22:16

## 2019-03-25 RX ADMIN — TRAMADOL HYDROCHLORIDE 50 MILLIGRAM(S): 50 TABLET ORAL at 06:10

## 2019-03-25 RX ADMIN — Medication 2000 MILLIGRAM(S): at 23:17

## 2019-03-25 RX ADMIN — FAMOTIDINE 20 MILLIGRAM(S): 10 INJECTION INTRAVENOUS at 06:38

## 2019-03-25 RX ADMIN — SODIUM CHLORIDE 100 MILLILITER(S): 9 INJECTION, SOLUTION INTRAVENOUS at 00:03

## 2019-03-25 NOTE — BRIEF OPERATIVE NOTE - NSICDXBRIEFPROCEDURE_GEN_ALL_CORE_FT
PROCEDURES:  Open replacement of right distal femur with synthetic substitute 25-Mar-2019 18:02:29  Aditi Myers

## 2019-03-25 NOTE — BRIEF OPERATIVE NOTE - NSICDXBRIEFPREOP_GEN_ALL_CORE_FT
PRE-OP DIAGNOSIS:  Other closed fracture of distal end of right femur, initial encounter 25-Mar-2019 18:04:26  Aditi Myers

## 2019-03-25 NOTE — PROGRESS NOTE ADULT - SUBJECTIVE AND OBJECTIVE BOX
Orthopaedic Surgery Progress Note    Patient seen and examined. SHELBI. Patient without complaints. Pain endorsed but controlled. Denies CP, SOB, N/V, tactile fevers, calf pain. Pt is pre op for today due to right distal femur periprosthetic fracture.      Vital Signs Last 24 Hrs  T(C): 36.5 (25 Mar 2019 08:36), Max: 37.6 (25 Mar 2019 01:00)  T(F): 97.7 (25 Mar 2019 08:36), Max: 99.6 (25 Mar 2019 01:00)  HR: 61 (25 Mar 2019 08:36) (61 - 66)  BP: 151/67 (25 Mar 2019 08:36) (105/51 - 151/67)  BP(mean): --  RR: 16 (25 Mar 2019 08:36) (14 - 17)  SpO2: 96% (25 Mar 2019 08:36) (94% - 96%)      Physical Exam:  Pt laying comfortably in bed, NAD.  Skin warm and well perfused, no visible erythema/ecchymoses.  Right KI in place   EHL/FHL/TA/GS 5/5 motor strength bilaterally  SLT in tact and equal to distal bilateral lower extremities  Calves soft and nontender to palpation bilaterally   DP/PT pulses 2+    LABS                        9.2    7.13  )-----------( 146      ( 25 Mar 2019 07:15 )             29.8                              PT/INR - ( 24 Mar 2019 06:09 )   PT: 12.9 sec;   INR: 1.14          PTT - ( 24 Mar 2019 06:09 )  PTT:31.6 sec  03-25    138  |  101  |  15  ----------------------------<  106<H>  4.2   |  28  |  1.15    Ca    8.5      25 Mar 2019 07:15      A/P: 89F with right distal femur periprosthetic fracture, for OR today    CONTINUE:        1. PT - NWB in KI  2. DVT prophylaxis - SCDs, eliquis held (hx Afib)  3. Pain Control as needed   4. Dispo:  OR today, medically optimized by Dr. Lopez

## 2019-03-25 NOTE — PROGRESS NOTE ADULT - SUBJECTIVE AND OBJECTIVE BOX
Interval Events: Reviewed  Patient seen and examined at bedside.    Patient is a 89y old  Female who presents with a chief complaint of   she is waiting for surgery  PAST MEDICAL & SURGICAL HISTORY:  H/O: knee surgery: BL      MEDICATIONS:  Pulmonary:    Antimicrobials:  ceFAZolin  Injectable. 2000 milliGRAM(s) IV Push every 8 hours    Anticoagulants:  aspirin enteric coated 325 milliGRAM(s) Oral two times a day    Cardiac:  furosemide    Tablet 40 milliGRAM(s) Oral daily      Allergies    sulfa drugs (Rash)    Intolerances        Vital Signs Last 24 Hrs  T(C): 36.6 (25 Mar 2019 21:15), Max: 37.6 (25 Mar 2019 01:00)  T(F): 97.9 (25 Mar 2019 21:15), Max: 99.6 (25 Mar 2019 01:00)  HR: 66 (25 Mar 2019 21:15) (61 - 94)  BP: 122/56 (25 Mar 2019 21:15) (110/58 - 151/67)  BP(mean): 81 (25 Mar 2019 21:15) (76 - 87)  RR: 14 (25 Mar 2019 21:15) (13 - 26)  SpO2: 98% (25 Mar 2019 21:15) (91% - 100%)     @ 07:  -   @ 07:00  --------------------------------------------------------  IN: 800 mL / OUT: 950 mL / NET: -150 mL     @ 07: @ 22:07  --------------------------------------------------------  IN: 200 mL / OUT: 1525 mL / NET: -1325 mL          LABS:  ABG - ( 25 Mar 2019 15:51 )  pH, Arterial: 7.50  pH, Blood: x     /  pCO2: 38    /  pO2: 375   / HCO3: 29    / Base Excess: 5.2   /  SaO2: x                   CBC Full  -  ( 25 Mar 2019 19:28 )  WBC Count : 11.70 K/uL  Hemoglobin : 11.2 g/dL  Hematocrit : 34.4 %  Platelet Count - Automated : 142 K/uL  Mean Cell Volume : 97.5 fl  Mean Cell Hemoglobin : 31.7 pg  Mean Cell Hemoglobin Concentration : 32.6 gm/dL  Auto Neutrophil # : x  Auto Lymphocyte # : x  Auto Monocyte # : x  Auto Eosinophil # : x  Auto Basophil # : x  Auto Neutrophil % : x  Auto Lymphocyte % : x  Auto Monocyte % : x  Auto Eosinophil % : x  Auto Basophil % : x    03-25    133<L>  |  101  |  12  ----------------------------<  167<H>  4.5   |  25  |  0.98    Ca    7.8<L>      25 Mar 2019 19:28      PT/INR - ( 24 Mar 2019 06:09 )   PT: 12.9 sec;   INR: 1.14          PTT - ( 24 Mar 2019 06:09 )  PTT:31.6 sec      Urinalysis Basic - ( 25 Mar 2019 12:30 )    Color: Yellow / Appearance: Clear / S.015 / pH: x  Gluc: x / Ketone: NEGATIVE  / Bili: Negative / Urobili: 0.2 E.U./dL   Blood: x / Protein: NEGATIVE mg/dL / Nitrite: NEGATIVE   Leuk Esterase: NEGATIVE / RBC: x / WBC x   Sq Epi: x / Non Sq Epi: x / Bacteria: x        echo reviewed          RADIOLOGY & ADDITIONAL STUDIES (The following images were personally reviewed):  Abernathy:                                     No  Urine output:                       adequate  DVT prophylaxis:                 Yes  Flattus:                                  Yes  Bowel movement:              No

## 2019-03-26 DIAGNOSIS — Z98.890 OTHER SPECIFIED POSTPROCEDURAL STATES: ICD-10-CM

## 2019-03-26 PROBLEM — Z00.00 ENCOUNTER FOR PREVENTIVE HEALTH EXAMINATION: Status: ACTIVE | Noted: 2019-03-26

## 2019-03-26 LAB
ANION GAP SERPL CALC-SCNC: 7 MMOL/L — SIGNIFICANT CHANGE UP (ref 5–17)
BUN SERPL-MCNC: 13 MG/DL — SIGNIFICANT CHANGE UP (ref 7–23)
CALCIUM SERPL-MCNC: 8.3 MG/DL — LOW (ref 8.4–10.5)
CHLORIDE SERPL-SCNC: 102 MMOL/L — SIGNIFICANT CHANGE UP (ref 96–108)
CO2 SERPL-SCNC: 29 MMOL/L — SIGNIFICANT CHANGE UP (ref 22–31)
CREAT SERPL-MCNC: 1.01 MG/DL — SIGNIFICANT CHANGE UP (ref 0.5–1.3)
GLUCOSE SERPL-MCNC: 146 MG/DL — HIGH (ref 70–99)
HCT VFR BLD CALC: 33.6 % — LOW (ref 34.5–45)
HGB BLD-MCNC: 10.9 G/DL — LOW (ref 11.5–15.5)
MCHC RBC-ENTMCNC: 31.9 PG — SIGNIFICANT CHANGE UP (ref 27–34)
MCHC RBC-ENTMCNC: 32.4 GM/DL — SIGNIFICANT CHANGE UP (ref 32–36)
MCV RBC AUTO: 98.2 FL — SIGNIFICANT CHANGE UP (ref 80–100)
NRBC # BLD: 0 /100 WBCS — SIGNIFICANT CHANGE UP (ref 0–0)
PLATELET # BLD AUTO: 139 K/UL — LOW (ref 150–400)
POTASSIUM SERPL-MCNC: 5.1 MMOL/L — SIGNIFICANT CHANGE UP (ref 3.5–5.3)
POTASSIUM SERPL-SCNC: 5.1 MMOL/L — SIGNIFICANT CHANGE UP (ref 3.5–5.3)
RBC # BLD: 3.42 M/UL — LOW (ref 3.8–5.2)
RBC # FLD: 20.1 % — HIGH (ref 10.3–14.5)
SODIUM SERPL-SCNC: 138 MMOL/L — SIGNIFICANT CHANGE UP (ref 135–145)
WBC # BLD: 12.01 K/UL — HIGH (ref 3.8–10.5)
WBC # FLD AUTO: 12.01 K/UL — HIGH (ref 3.8–10.5)

## 2019-03-26 PROCEDURE — 99232 SBSQ HOSP IP/OBS MODERATE 35: CPT | Mod: GC

## 2019-03-26 RX ORDER — APIXABAN 2.5 MG/1
2.5 TABLET, FILM COATED ORAL EVERY 12 HOURS
Qty: 0 | Refills: 0 | Status: COMPLETED | OUTPATIENT
Start: 2019-03-26 | End: 2019-03-27

## 2019-03-26 RX ORDER — AMIODARONE HYDROCHLORIDE 400 MG/1
200 TABLET ORAL DAILY
Qty: 0 | Refills: 0 | Status: DISCONTINUED | OUTPATIENT
Start: 2019-03-26 | End: 2019-03-27

## 2019-03-26 RX ADMIN — PANTOPRAZOLE SODIUM 40 MILLIGRAM(S): 20 TABLET, DELAYED RELEASE ORAL at 06:17

## 2019-03-26 RX ADMIN — Medication 100 MILLIGRAM(S): at 06:17

## 2019-03-26 RX ADMIN — POLYETHYLENE GLYCOL 3350 17 GRAM(S): 17 POWDER, FOR SOLUTION ORAL at 11:52

## 2019-03-26 RX ADMIN — Medication 325 MILLIGRAM(S): at 17:58

## 2019-03-26 RX ADMIN — Medication 2000 MILLIGRAM(S): at 06:19

## 2019-03-26 RX ADMIN — Medication 100 MILLIGRAM(S): at 15:51

## 2019-03-26 RX ADMIN — Medication 40 MILLIGRAM(S): at 06:18

## 2019-03-26 RX ADMIN — APIXABAN 2.5 MILLIGRAM(S): 2.5 TABLET, FILM COATED ORAL at 17:58

## 2019-03-26 RX ADMIN — AMIODARONE HYDROCHLORIDE 200 MILLIGRAM(S): 400 TABLET ORAL at 11:51

## 2019-03-26 RX ADMIN — Medication 25 MICROGRAM(S): at 06:17

## 2019-03-26 RX ADMIN — Medication 325 MILLIGRAM(S): at 06:19

## 2019-03-26 RX ADMIN — OXYCODONE HYDROCHLORIDE 5 MILLIGRAM(S): 5 TABLET ORAL at 10:11

## 2019-03-26 RX ADMIN — Medication 100 MILLIGRAM(S): at 21:33

## 2019-03-26 NOTE — PROGRESS NOTE ADULT - ASSESSMENT
A/P: 89yFemale POD1 s/p R distal femur replacement  - Stable overnight  - Pain/Nausea Control adequate  - Home meds -- restarting Eliquis at 2.5 BID today, increasing to home dose 5mg BID tomorrow as per John; Home dose Amiodarone as well  - DVT ppx: Eliquis as above  - WBS: 50% PWB RLE  - PT: pending initial PT eval      Ortho Pager [phone]

## 2019-03-26 NOTE — PROGRESS NOTE ADULT - SUBJECTIVE AND OBJECTIVE BOX
Ortho Post Op Check    Procedure: L distal femur replacement   Surgeon: David     Pt comfortable without complaints, pain controlled  Denies CP, SOB, N/V, numbness/tingling     Vital Signs Last 24 Hrs  T(C): 36.4 (03-26-19 @ 00:31), Max: 36.8 (03-25-19 @ 18:59)  T(F): 97.6 (03-26-19 @ 00:31), Max: 98.2 (03-25-19 @ 18:59)  HR: 70 (03-26-19 @ 00:31) (62 - 94)  BP: 118/57 (03-26-19 @ 00:31) (108/55 - 136/58)  BP(mean): 81 (03-25-19 @ 21:15) (76 - 87)  RR: 16 (03-26-19 @ 00:31) (13 - 26)  SpO2: 96% (03-26-19 @ 00:31) (91% - 100%)  AVSS    General: Pt Alert and oriented, NAD  DSG C/D/I, HV x 1   Pulses: 2+ PT  Sensation:  SILT s/s/sp/dp/t  Motor: EHL/FHL/TA/GS intact                          11.2   11.70 )-----------( 142      ( 25 Mar 2019 19:28 )             34.4   25 Mar 2019 19:28    133    |  101    |  12     ----------------------------<  167    4.5     |  25     |  0.98     Ca    7.8        25 Mar 2019 19:28        Post-op X-Ray: prosthesis intact     A/P: 89yFemale s/p L distal femur replacement   - Stable  - Pain Control  - DVT ppx:  BID / SCDs  - Post op abx: Ancef   - PT, WBS:  PWB    Ortho Pager [phone]

## 2019-03-26 NOTE — PROGRESS NOTE ADULT - SUBJECTIVE AND OBJECTIVE BOX
Orthopaedic Surgery Progress Note    Patient seen and examined. SHELBI. Patient without complaints. Pain controlled. Denies CP, SOB, N/V, tactile fevers, calf pain.       Vital Signs Last 24 Hrs  T(C): 36.3 (26 Mar 2019 08:39), Max: 36.8 (25 Mar 2019 18:59)  T(F): 97.3 (26 Mar 2019 08:39), Max: 98.2 (25 Mar 2019 18:59)  HR: 68 (26 Mar 2019 08:39) (62 - 94)  BP: 137/63 (26 Mar 2019 08:39) (108/55 - 144/65)  BP(mean): 81 (25 Mar 2019 21:15) (76 - 87)  RR: 18 (26 Mar 2019 08:39) (13 - 26)  SpO2: 93% (26 Mar 2019 08:39) (91% - 100%)         CAPILLARY BLOOD GLUCOSE      POCT Blood Glucose.: 117 mg/dL (25 Mar 2019 15:54)                  Physical Exam:  Pt laying comfortably in bed, NAD.  Skin warm and well perfused, no visible erythema/ecchymoses.  Dressing C/D/I  EHL/FHL/TA/GS 5/5 motor strength bilaterally  SLT in tact and equal to distal bilateral lower extremities  DP/PT pulses 2+  Calves soft and nontender to palpation    LABS                        10.9   12.01 )-----------( 139      ( 26 Mar 2019 05:48 )             33.6                                03-26    138  |  102  |  13  ----------------------------<  146<H>  5.1   |  29  |  1.01    Ca    8.3<L>      26 Mar 2019 05:48        A/P: 89F POD #1 s/p TKR revision/distal femur replacement     CONTINUE:        1. PT: WBAT  2. DVT prophylaxis: Eliquis 2.5 BID, 5mg BID starting tomorrow  3. Pain Control as needed   4. Dispo: Pending PT eval

## 2019-03-26 NOTE — PHYSICAL THERAPY INITIAL EVALUATION ADULT - ADDITIONAL COMMENTS
Pt visiting form [place] where she lives on an assisted living campus. At baseline, patient indpt with amb and ADLs without device. Reports she uses a tricycle around [place]'s campus and has transportation to errands, groceries, and appointments. Pt uses O2 at night. Pt took train and subway in [facility], indpt amb with suitcase prior to fall and fx this admission.

## 2019-03-26 NOTE — PHYSICAL THERAPY INITIAL EVALUATION ADULT - ACTIVE RANGE OF MOTION EXAMINATION, REHAB EVAL
bilateral upper extremity Active ROM was WFL (within functional limits)/bilateral  lower extremity Active ROM was WFL (within functional limits)/R  knee flexion 0-80 +pain

## 2019-03-26 NOTE — PROGRESS NOTE ADULT - PROBLEM SELECTOR PLAN 7
The patient is hemodynamically stable.  The pain is controlled.  Patient is on DVT prophylaxis.  Patient is using incentive spirometry.  Oxygen saturation is acceptable.  Advance diet as tolerated.  Advance activity as tolerated.  Monitor for ileus.  Patient is on Laxatives.  Surgical wound is stable.  No indication for monitor bed.
is related to A fib and RINA

## 2019-03-26 NOTE — PROGRESS NOTE ADULT - SUBJECTIVE AND OBJECTIVE BOX
Interval Events: Reviewed  Patient seen and examined at bedside.    Patient is a 89y old  Female who presents with a chief complaint of   she is doing OK and passing gas  PAST MEDICAL & SURGICAL HISTORY:  H/O: knee surgery: BL      MEDICATIONS:  Pulmonary:    Antimicrobials:    Anticoagulants:  apixaban 2.5 milliGRAM(s) Oral every 12 hours  aspirin enteric coated 325 milliGRAM(s) Oral two times a day    Cardiac:  amiodarone    Tablet 200 milliGRAM(s) Oral daily  furosemide    Tablet 40 milliGRAM(s) Oral daily      Allergies    sulfa drugs (Rash)    Intolerances        Vital Signs Last 24 Hrs  T(C): 37.4 (26 Mar 2019 16:42), Max: 37.4 (26 Mar 2019 16:42)  T(F): 99.3 (26 Mar 2019 16:42), Max: 99.3 (26 Mar 2019 16:42)  HR: 73 (26 Mar 2019 16:42) (62 - 94)  BP: 130/63 (26 Mar 2019 16:42) (108/55 - 144/65)  BP(mean): 81 (25 Mar 2019 21:15) (76 - 87)  RR: 18 (26 Mar 2019 16:42) (13 - 26)  SpO2: 97% (26 Mar 2019 16:42) (87% - 100%)     @ 07: @ 07:00  --------------------------------------------------------  IN: 800 mL / OUT: 2410 mL / NET: -1610 mL     @ 07: @ 18:36  --------------------------------------------------------  IN: 760 mL / OUT: 2170 mL / NET: -1410 mL          LABS:  ABG - ( 25 Mar 2019 15:51 )  pH, Arterial: 7.50  pH, Blood: x     /  pCO2: 38    /  pO2: 375   / HCO3: 29    / Base Excess: 5.2   /  SaO2: x                   CBC Full  -  ( 26 Mar 2019 05:48 )  WBC Count : 12.01 K/uL  RBC Count : 3.42 M/uL  Hemoglobin : 10.9 g/dL  Hematocrit : 33.6 %  Platelet Count - Automated : 139 K/uL  Mean Cell Volume : 98.2 fl  Mean Cell Hemoglobin : 31.9 pg  Mean Cell Hemoglobin Concentration : 32.4 gm/dL  Auto Neutrophil # : x  Auto Lymphocyte # : x  Auto Monocyte # : x  Auto Eosinophil # : x  Auto Basophil # : x  Auto Neutrophil % : x  Auto Lymphocyte % : x  Auto Monocyte % : x  Auto Eosinophil % : x  Auto Basophil % : x        138  |  102  |  13  ----------------------------<  146<H>  5.1   |  29  |  1.01    Ca    8.3<L>      26 Mar 2019 05:48            Urinalysis Basic - ( 25 Mar 2019 12:30 )    Color: Yellow / Appearance: Clear / S.015 / pH: x  Gluc: x / Ketone: NEGATIVE  / Bili: Negative / Urobili: 0.2 E.U./dL   Blood: x / Protein: NEGATIVE mg/dL / Nitrite: NEGATIVE   Leuk Esterase: NEGATIVE / RBC: x / WBC x   Sq Epi: x / Non Sq Epi: x / Bacteria: x                  RADIOLOGY & ADDITIONAL STUDIES (The following images were personally reviewed):  Abernathy:                                     No  Urine output:                       adequate  DVT prophylaxis:                 Yes  Flattus:                                  Yes  Bowel movement:              No

## 2019-03-26 NOTE — PROGRESS NOTE ADULT - SUBJECTIVE AND OBJECTIVE BOX
Ortho Note    Pt comfortable without complaints, pain controlled  Denies CP, SOB, N/V, numbness/tingling     Vital Signs Last 24 Hrs  T(C): 36.7 (26 Mar 2019 05:00), Max: 36.8 (25 Mar 2019 18:59)  T(F): 98 (26 Mar 2019 05:00), Max: 98.2 (25 Mar 2019 18:59)  HR: 70 (26 Mar 2019 05:00) (61 - 94)  BP: 144/65 (26 Mar 2019 05:00) (108/55 - 151/67)  BP(mean): 81 (25 Mar 2019 21:15) (76 - 87)  RR: 16 (26 Mar 2019 05:00) (13 - 26)  SpO2: 98% (26 Mar 2019 05:00) (91% - 100%)    VSS  General: A&Ox3, NAD  RLE: Ace / Cryocuff DSG C/D/I; HV x1 w 220cc output since OR  Pulses: Foot WWP; DP pulse 2+; Cap refill < 2 sec  Sensation: SILT distally and symmetric to contralateral extremity  Motor: TA/EHL/FHL/GS 5/5 and symmetric to contralateral extremity                        10.9   12.01 )-----------( 139      ( 26 Mar 2019 05:48 )             33.6   26 Mar 2019 05:48    138    |  102    |  13     ----------------------------<  146    5.1     |  29     |  1.01     Ca    8.3        26 Mar 2019 05:48

## 2019-03-26 NOTE — PHYSICAL THERAPY INITIAL EVALUATION ADULT - DIAGNOSIS, PT EVAL
4H: Impaired Joint Mobility, Motor Function, Muscle Performance, and Range of Motion Associated with Joint Arthroplasty , 4G: Impaired Joint Mobility, Muscle Performance, and Range of Motion Associated with Fracture

## 2019-03-26 NOTE — PROGRESS NOTE ADULT - PROBLEM SELECTOR PLAN 1
rate is controlled on amiodarone.  Off anticoagulation
rate is controlled on amiodarone.  Start half the dose of anticoagualtion and advance to full dose if there is no bleeding

## 2019-03-26 NOTE — PHYSICAL THERAPY INITIAL EVALUATION ADULT - GAIT DEVIATIONS NOTED, PT EVAL
decreased weight-shifting ability/BETHEA, required seated rest, SpO2 91% on RA/decreased step length/decreased stride length/decreased cole

## 2019-03-26 NOTE — PHYSICAL THERAPY INITIAL EVALUATION ADULT - IMPAIRMENTS FOUND, PT EVAL
posture/joint integrity and mobility/muscle strength/ventilation and respiration/gas exchange/ergonomics and body mechanics/ROM/aerobic capacity/endurance/gait, locomotion, and balance

## 2019-03-27 ENCOUNTER — TRANSCRIPTION ENCOUNTER (OUTPATIENT)
Age: 84
End: 2019-03-27

## 2019-03-27 VITALS — WEIGHT: 123.02 LBS

## 2019-03-27 LAB
ANION GAP SERPL CALC-SCNC: 9 MMOL/L — SIGNIFICANT CHANGE UP (ref 5–17)
BUN SERPL-MCNC: 15 MG/DL — SIGNIFICANT CHANGE UP (ref 7–23)
CALCIUM SERPL-MCNC: 8.4 MG/DL — SIGNIFICANT CHANGE UP (ref 8.4–10.5)
CHLORIDE SERPL-SCNC: 99 MMOL/L — SIGNIFICANT CHANGE UP (ref 96–108)
CO2 SERPL-SCNC: 32 MMOL/L — HIGH (ref 22–31)
CREAT SERPL-MCNC: 1.15 MG/DL — SIGNIFICANT CHANGE UP (ref 0.5–1.3)
GLUCOSE SERPL-MCNC: 117 MG/DL — HIGH (ref 70–99)
HCT VFR BLD CALC: 30.2 % — LOW (ref 34.5–45)
HGB BLD-MCNC: 9.9 G/DL — LOW (ref 11.5–15.5)
MCHC RBC-ENTMCNC: 32.8 GM/DL — SIGNIFICANT CHANGE UP (ref 32–36)
MCHC RBC-ENTMCNC: 32.8 PG — SIGNIFICANT CHANGE UP (ref 27–34)
MCV RBC AUTO: 100 FL — SIGNIFICANT CHANGE UP (ref 80–100)
NRBC # BLD: 0 /100 WBCS — SIGNIFICANT CHANGE UP (ref 0–0)
PLATELET # BLD AUTO: 150 K/UL — SIGNIFICANT CHANGE UP (ref 150–400)
POTASSIUM SERPL-MCNC: 4.3 MMOL/L — SIGNIFICANT CHANGE UP (ref 3.5–5.3)
POTASSIUM SERPL-SCNC: 4.3 MMOL/L — SIGNIFICANT CHANGE UP (ref 3.5–5.3)
RBC # BLD: 3.02 M/UL — LOW (ref 3.8–5.2)
RBC # FLD: 19.6 % — HIGH (ref 10.3–14.5)
SODIUM SERPL-SCNC: 140 MMOL/L — SIGNIFICANT CHANGE UP (ref 135–145)
WBC # BLD: 11.14 K/UL — HIGH (ref 3.8–10.5)
WBC # FLD AUTO: 11.14 K/UL — HIGH (ref 3.8–10.5)

## 2019-03-27 RX ORDER — APIXABAN 2.5 MG/1
0 TABLET, FILM COATED ORAL
Qty: 0 | Refills: 0 | COMMUNITY

## 2019-03-27 RX ORDER — OXYCODONE HYDROCHLORIDE 5 MG/1
1 TABLET ORAL
Qty: 0 | Refills: 0 | COMMUNITY
Start: 2019-03-27

## 2019-03-27 RX ORDER — POLYETHYLENE GLYCOL 3350 17 G/17G
17 POWDER, FOR SOLUTION ORAL
Qty: 0 | Refills: 0 | COMMUNITY
Start: 2019-03-27

## 2019-03-27 RX ORDER — APIXABAN 2.5 MG/1
2.5 TABLET, FILM COATED ORAL
Qty: 0 | Refills: 0 | COMMUNITY

## 2019-03-27 RX ORDER — APIXABAN 2.5 MG/1
5 TABLET, FILM COATED ORAL EVERY 12 HOURS
Qty: 0 | Refills: 0 | Status: DISCONTINUED | OUTPATIENT
Start: 2019-03-27 | End: 2019-03-27

## 2019-03-27 RX ORDER — MAGNESIUM HYDROXIDE 400 MG/1
30 TABLET, CHEWABLE ORAL
Qty: 0 | Refills: 0 | COMMUNITY
Start: 2019-03-27

## 2019-03-27 RX ORDER — DOCUSATE SODIUM 100 MG
1 CAPSULE ORAL
Qty: 0 | Refills: 0 | COMMUNITY
Start: 2019-03-27

## 2019-03-27 RX ORDER — SENNA PLUS 8.6 MG/1
2 TABLET ORAL
Qty: 0 | Refills: 0 | COMMUNITY
Start: 2019-03-27

## 2019-03-27 RX ORDER — APIXABAN 2.5 MG/1
2.5 TABLET, FILM COATED ORAL EVERY 12 HOURS
Qty: 0 | Refills: 0 | Status: DISCONTINUED | OUTPATIENT
Start: 2019-03-27 | End: 2019-03-27

## 2019-03-27 RX ORDER — ACETAMINOPHEN 500 MG
2 TABLET ORAL
Qty: 0 | Refills: 0 | COMMUNITY
Start: 2019-03-27

## 2019-03-27 RX ADMIN — AMIODARONE HYDROCHLORIDE 200 MILLIGRAM(S): 400 TABLET ORAL at 06:37

## 2019-03-27 RX ADMIN — Medication 25 MICROGRAM(S): at 06:37

## 2019-03-27 RX ADMIN — Medication 100 MILLIGRAM(S): at 06:37

## 2019-03-27 RX ADMIN — OXYCODONE HYDROCHLORIDE 10 MILLIGRAM(S): 5 TABLET ORAL at 13:24

## 2019-03-27 RX ADMIN — OXYCODONE HYDROCHLORIDE 10 MILLIGRAM(S): 5 TABLET ORAL at 01:12

## 2019-03-27 RX ADMIN — Medication 100 MILLIGRAM(S): at 13:23

## 2019-03-27 RX ADMIN — Medication 40 MILLIGRAM(S): at 06:37

## 2019-03-27 RX ADMIN — OXYCODONE HYDROCHLORIDE 10 MILLIGRAM(S): 5 TABLET ORAL at 02:10

## 2019-03-27 RX ADMIN — POLYETHYLENE GLYCOL 3350 17 GRAM(S): 17 POWDER, FOR SOLUTION ORAL at 13:23

## 2019-03-27 RX ADMIN — PANTOPRAZOLE SODIUM 40 MILLIGRAM(S): 20 TABLET, DELAYED RELEASE ORAL at 06:37

## 2019-03-27 RX ADMIN — APIXABAN 2.5 MILLIGRAM(S): 2.5 TABLET, FILM COATED ORAL at 06:37

## 2019-03-27 RX ADMIN — Medication 325 MILLIGRAM(S): at 06:39

## 2019-03-27 NOTE — DISCHARGE NOTE NURSING/CASE MANAGEMENT/SOCIAL WORK - NSDCDPATPORTLINK_GEN_ALL_CORE
You can access the [facility] Patient Portal, offered by [facility], by registering with the following website: [email or web address]

## 2019-03-27 NOTE — DIETITIAN INITIAL EVALUATION ADULT. - OTHER INFO
89 year old female with hx afib, now s/p s/p R TKA revision and distal femur replacement 3/25. Pt consuming ~50% of meals, reports fair appetite at baseline. Pt unsure if she has had weight loss. GI: last BM 3/23, denies N/V. Pt denies pain. Skin: surgical incision R knee. Encouraged pt to increase PO intake as able with emphasis on lean protein s/p surgery. Recommend change diet to low sodium as medially appropriate 2/2 significant PMHx/medication. Educated pt on low sodium diet. Encouraged pt to wear dentures when eating 2/2 poor dentition. Pt appeared receptive to recommendations. RD to monitor and f/u per protocol. Plan for d/c to rehab possible this afternoon.

## 2019-03-27 NOTE — DISCHARGE NOTE PROVIDER - NSDCACTIVITY_GEN_ALL_CORE
Do not drive or operate machinery/Do not make important decisions/No heavy lifting/straining Do not drive or operate machinery/Showering allowed/No heavy lifting/straining/Do not make important decisions

## 2019-03-27 NOTE — PROGRESS NOTE ADULT - PROVIDER SPECIALTY LIST ADULT
Internal Medicine
Orthopedics
Internal Medicine

## 2019-03-27 NOTE — DISCHARGE NOTE PROVIDER - CARE PROVIDER_API CALL
Talat Hernandez)  Orthopaedic Surgery  [address] [place] [address]  Phone: [phone]  Fax: [phone]  Follow Up Time:

## 2019-03-27 NOTE — DISCHARGE NOTE PROVIDER - NSDCFUADDINST_GEN_ALL_CORE_FT
Weight bear as tolerated with assistive device.  No strenuous activity, heavy lifting, driving or returning to work until cleared by MD.  You may shower after removing the ace bandage - dressing is water-resistant, no soaking in bathtubs.  Remove dressing after post op day 5-7, then leave incision open to air. Keep incision clean and dry.  Try to have regular bowel movements, take stool softener or laxative if necessary.  May take Pepcid or Zantac for upset stomach.  May take Aleve or Naproxen instead of Meloxicam/Celebrex.  Swelling may travel all the way down leg to foot, this is normal and will subside in a few weeks.  Call to schedule an appt with Dr. Hernandez for follow up, if you have staples or sutures they will be removed in office.  Contact your doctor if you experience: fever greater than 101.5, chills, chest pain, difficulty breathing, redness or excessive drainage around the incision, other concerns.  Follow up with your primary care provider. Weight bear as tolerated with assistive device.  No strenuous activity, heavy lifting, driving or returning to work until cleared by MD.  You may shower after removing the ace bandage - dressing is water-resistant, no soaking in bathtubs.  Remove dressing after post op day 5-7, then leave incision open to air. Keep incision clean and dry.  Try to have regular bowel movements, take stool softener or laxative if necessary.  Swelling may travel all the way down leg to foot, this is normal and will subside in a few weeks.  Call to schedule an appt with Dr. Hernandez for follow up, if you have staples or sutures they will be removed in office.  Contact your doctor if you experience: fever greater than 101.5, chills, chest pain, difficulty breathing, redness or excessive drainage around the incision, other concerns.  Follow up with your primary care provider.

## 2019-03-27 NOTE — DISCHARGE NOTE PROVIDER - HOSPITAL COURSE
Admitted    Surgery - Right TKA revision and distal femur replacement     Monie-op Antibiotics    Pain control    DVT prophylaxis    OOB/Physical Therapy

## 2019-03-27 NOTE — DIETITIAN INITIAL EVALUATION ADULT. - ENERGY NEEDS
Ht (3/24): 165.1cm, Wt (3/24): 55.8kg, IBW: 125# +/-10%, %IBW: 98%, BMI: 20.5  ABW used to calculate energy needs due to pt's current body weight within % IBW. Needs adjusted s/p surgery.

## 2019-03-27 NOTE — PROGRESS NOTE ADULT - SUBJECTIVE AND OBJECTIVE BOX
Ortho Note    Pt comfortable without complaints, pain controlled.  Denies CP, SOB, N/V, numbness/tingling down lower extremities b/l.    Vital Signs Last 24 Hrs  T(C): 36.1 (03-27-19 @ 09:28), Max: 36.1 (03-27-19 @ 09:28)  T(F): 97 (03-27-19 @ 09:28), Max: 97 (03-27-19 @ 09:28)  HR: 84 (03-27-19 @ 09:28) (84 - 84)  BP: 144/67 (03-27-19 @ 09:28) (144/67 - 144/67)  BP(mean): --  RR: 18 (03-27-19 @ 09:28) (18 - 18)  SpO2: 98% (03-27-19 @ 09:28) (98% - 98%)      General: Pt Alert and oriented, NAD  DSG ace wrap LLE C/D/I HVx1  Pulses:  DP's +2 b/l, brisk cap refill b/l  Sensation: gross sensation to light touch intact throughout lower extremities b/l  Motor: EHL/FHL/TA/GS 5/5 b/l                          9.9    11.14 )-----------( 150      ( 27 Mar 2019 06:53 )             30.2   27 Mar 2019 06:53    140    |  99     |  15     ----------------------------<  117    4.3     |  32     |  1.15     Ca    8.4        27 Mar 2019 06:53        A/P: 89yFemale POD# s/p revision R TKA  - Stable  - Pain Control as needed  - DVT ppx: asa	  - PT, WBS: WBAT  - Monitor drain output  - Step down to regional bed as per Dr. John bautista  - Will continue to appreciate Medicine recs.       Ortho Pager [phone]

## 2019-03-27 NOTE — PROGRESS NOTE ADULT - SUBJECTIVE AND OBJECTIVE BOX
Ortho Note    Pt comfortable without complaints, pain controlled  Denies CP, SOB, N/V, numbness/tingling     Vital Signs Last 24 Hrs  T(C): 36.5 (03-27-19 @ 05:01), Max: 36.5 (03-27-19 @ 05:01)  T(F): 97.7 (03-27-19 @ 05:01), Max: 97.7 (03-27-19 @ 05:01)  HR: 64 (03-27-19 @ 05:01) (64 - 64)  BP: 134/63 (03-27-19 @ 05:01) (134/63 - 134/63)  BP(mean): --  RR: 19 (03-27-19 @ 05:01) (19 - 19)  SpO2: 97% (03-27-19 @ 05:01) (97% - 97%)    General: Pt Alert and oriented, NAD  R knee DSG C/D/I  Pulses: 2+ DP/PT  Sensation: s/s/sp/dp/t intact  Motor: EHL/FHL/TA/GS 5/5                          9.9    11.14 )-----------( 150      ( 27 Mar 2019 06:53 )             30.2   27 Mar 2019 06:53    140    |  99     |  15     ----------------------------<  117    4.3     |  32     |  1.15     Ca    8.4        27 Mar 2019 06:53        A/P: 89yFemale POD# s/p R TKA revision and distal femur replacement 3/25  - Stable  - Pain Control  - DVT ppx: SCDs, ASA  - PT, WBS: WBAT  - f/u AM labs  - dispo home v rehab     Ortho Pager [phone]

## 2019-03-27 NOTE — DISCHARGE NOTE PROVIDER - NSDCCPTREATMENT_GEN_ALL_CORE_FT
PRINCIPAL PROCEDURE  Procedure: Open replacement of right distal femur with synthetic substitute  Findings and Treatment:

## 2019-03-27 NOTE — DISCHARGE NOTE PROVIDER - NSDCCPCAREPLAN_GEN_ALL_CORE_FT
PRINCIPAL DISCHARGE DIAGNOSIS  Diagnosis: Acute pain of right knee  Assessment and Plan of Treatment:       SECONDARY DISCHARGE DIAGNOSES  Diagnosis: Fall, initial encounter  Assessment and Plan of Treatment:     Diagnosis: Acute pain of right knee  Assessment and Plan of Treatment:

## 2019-03-28 PROCEDURE — 36430 TRANSFUSION BLD/BLD COMPNT: CPT

## 2019-03-28 PROCEDURE — 93005 ELECTROCARDIOGRAM TRACING: CPT

## 2019-03-28 PROCEDURE — 36415 COLL VENOUS BLD VENIPUNCTURE: CPT

## 2019-03-28 PROCEDURE — 73700 CT LOWER EXTREMITY W/O DYE: CPT

## 2019-03-28 PROCEDURE — 82330 ASSAY OF CALCIUM: CPT

## 2019-03-28 PROCEDURE — C1713: CPT

## 2019-03-28 PROCEDURE — P9016: CPT

## 2019-03-28 PROCEDURE — 73552 X-RAY EXAM OF FEMUR 2/>: CPT

## 2019-03-28 PROCEDURE — 73560 X-RAY EXAM OF KNEE 1 OR 2: CPT

## 2019-03-28 PROCEDURE — 96374 THER/PROPH/DIAG INJ IV PUSH: CPT

## 2019-03-28 PROCEDURE — 99285 EMERGENCY DEPT VISIT HI MDM: CPT | Mod: 25

## 2019-03-28 PROCEDURE — 81003 URINALYSIS AUTO W/O SCOPE: CPT

## 2019-03-28 PROCEDURE — C1769: CPT

## 2019-03-28 PROCEDURE — 84132 ASSAY OF SERUM POTASSIUM: CPT

## 2019-03-28 PROCEDURE — 97161 PT EVAL LOW COMPLEX 20 MIN: CPT

## 2019-03-28 PROCEDURE — 82962 GLUCOSE BLOOD TEST: CPT

## 2019-03-28 PROCEDURE — 84295 ASSAY OF SERUM SODIUM: CPT

## 2019-03-28 PROCEDURE — 85025 COMPLETE CBC W/AUTO DIFF WBC: CPT

## 2019-03-28 PROCEDURE — 86900 BLOOD TYPING SEROLOGIC ABO: CPT

## 2019-03-28 PROCEDURE — 86901 BLOOD TYPING SEROLOGIC RH(D): CPT

## 2019-03-28 PROCEDURE — 85018 HEMOGLOBIN: CPT

## 2019-03-28 PROCEDURE — 85027 COMPLETE CBC AUTOMATED: CPT

## 2019-03-28 PROCEDURE — 85610 PROTHROMBIN TIME: CPT

## 2019-03-28 PROCEDURE — 80048 BASIC METABOLIC PNL TOTAL CA: CPT

## 2019-03-28 PROCEDURE — 71045 X-RAY EXAM CHEST 1 VIEW: CPT

## 2019-03-28 PROCEDURE — 97116 GAIT TRAINING THERAPY: CPT

## 2019-03-28 PROCEDURE — 86850 RBC ANTIBODY SCREEN: CPT

## 2019-03-28 PROCEDURE — 86923 COMPATIBILITY TEST ELECTRIC: CPT

## 2019-03-28 PROCEDURE — 85730 THROMBOPLASTIN TIME PARTIAL: CPT

## 2019-03-28 PROCEDURE — 73502 X-RAY EXAM HIP UNI 2-3 VIEWS: CPT

## 2019-03-28 PROCEDURE — C1776: CPT

## 2019-03-28 PROCEDURE — C8929: CPT

## 2019-03-28 PROCEDURE — 87641 MR-STAPH DNA AMP PROBE: CPT

## 2019-04-01 DIAGNOSIS — I48.2 CHRONIC ATRIAL FIBRILLATION: ICD-10-CM
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
## 2019-04-01 DIAGNOSIS — G47.33 OBSTRUCTIVE SLEEP APNEA (ADULT) (PEDIATRIC): ICD-10-CM

## 2019-04-01 DIAGNOSIS — I27.20 PULMONARY HYPERTENSION, UNSPECIFIED: ICD-10-CM

## 2019-04-01 DIAGNOSIS — M97.11XA PERIPROSTHETIC FRACTURE AROUND INTERNAL PROSTHETIC RIGHT KNEE JOINT, INITIAL ENCOUNTER: ICD-10-CM

## 2019-04-01 DIAGNOSIS — S72.491A OTHER FRACTURE OF LOWER END OF RIGHT FEMUR, INITIAL ENCOUNTER FOR CLOSED FRACTURE: ICD-10-CM

## 2019-04-01 DIAGNOSIS — D50.0 IRON DEFICIENCY ANEMIA SECONDARY TO BLOOD LOSS (CHRONIC): ICD-10-CM
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
## 2019-04-17 ENCOUNTER — APPOINTMENT (OUTPATIENT)
Age: 84
End: 2019-04-17
Payer: MEDICARE

## 2019-04-17 DIAGNOSIS — Z96.651 PRESENCE OF RIGHT ARTIFICIAL KNEE JOINT: ICD-10-CM

## 2019-04-17 PROCEDURE — 99024 POSTOP FOLLOW-UP VISIT: CPT

## 2019-04-19 PROBLEM — Z96.651 HISTORY OF ARTHROPLASTY OF RIGHT KNEE: Status: ACTIVE | Noted: 2019-04-19

## 2019-04-19 NOTE — ASSESSMENT
[FreeTextEntry1] : Assessment\par #1 status post right knee revision/distal femur placement for periprosthetic fracture\par \par Plan\par #1 Staples removed, Steri-Strips applied, wound care structures given\par #2 continue physical therapy to focus on range of motion strength, ambulation, fall prevention\par #3 okay to move back home to [place]\par #4 okay to transition tear back to orthopedics who did her initial knee replacement in [place]\par #5 call for any questions issues or concerns

## 2019-04-19 NOTE — HISTORY OF PRESENT ILLNESS
[de-identified] : Following up after her right knee revision, she had a fall and suffered a right knee periprosthetic fracture, she had a distal femur replacement. Reports good pain control is getting daily physical therapy and her subacute nursing facility and is overall in good spirits and has no complaints at this time. She is accompanied today by her son.

## 2023-10-20 NOTE — CONSULT NOTE ADULT - SKIN
No lesions; no rash Chart reviewed. Pt encountered semi-reclined in bed. In NAD. Pt is [place], Pts brother law at b/s and assisted with communicating with pt.

## 2024-06-05 NOTE — BRIEF OPERATIVE NOTE - SPECIMENS
Spoke with Bubba Moralez NP regarding start time for resuming Eliquis. Responded saying anytime is fine. I will resume Eliquis now.    None

## 2024-10-02 NOTE — DISCHARGE NOTE PROVIDER - INSTRUCTIONS
[employer] form completed by NP and faxed to [employer] at [phone] and [facility] in [place] @ [phone].   as tolerated

## 2024-12-30 NOTE — ED ADULT NURSE NOTE - CHPI ED NUR SYMPTOMS POS
[facility]     PT arrived via:  [x] Ambulatory         [] Wheelchair  []With transport                [] Other:     [x]PT oriented to room and call light in reach.   [x] Vital signs obtained and are stable.   [x]Medication education provided and reviewed with patient.             .   DEFORMITY/STIFFNESS/PAIN